# Patient Record
(demographics unavailable — no encounter records)

---

## 2024-10-10 NOTE — PHYSICAL EXAM
[EOMI] : grossly EOMI [Conjuctival Injection] : no conjunctival injection [Increased Tearing] : no increased tearing [Discharge] : discharge [Eyelid Swelling] : eyelid swelling [Right] : (right) [Allergic Shiners] : allergic shiners [NL] : warm, clear [FreeTextEntry5] : stye on right upper eyelid / a 'dot of discharge on upper eyelashes

## 2024-10-10 NOTE — HISTORY OF PRESENT ILLNESS
[FreeTextEntry6] : patient is here today because he was sent home from school because he had a 'dot' on his right upper eyelashes and that the dot seems to be getting bigger. there was no n/v/d/rash or fever / no medication has been given.

## 2024-10-30 NOTE — REVIEW OF SYSTEMS
[Difficulty with Sleep] : difficulty with sleep [Cough] : cough [Congestion] : congestion [Negative] : Genitourinary

## 2024-10-30 NOTE — DISCUSSION/SUMMARY
[FreeTextEntry1] : atypical pneumonia - azithromycin prescribes. continue albuterol 2 puffs every 4 to 6 hours and wean as tolerated.

## 2024-10-30 NOTE — HISTORY OF PRESENT ILLNESS
[FreeTextEntry6] : patient is here today because he has had dry cough for several days . he has had a cough on and off for a longer time   there has been no n/v/d/rash or fever.  he has had a difficult time sleeping. he had been given OTC cough medications which did not help. Grandmother had given albuterol via the spacer which helped temporarily but grandmother states that mom was reluctant to continue it.  he had been given the albuterol at a previous ER visit.

## 2024-10-30 NOTE — PHYSICAL EXAM
[Clear to Auscultation Bilaterally] : not clear to auscultation bilaterally [Wheezing] : no wheezing [Rales] : no rales [Crackles] : crackles [Transmitted Upper Airway Sounds] : no transmitted upper airway sounds [Tachypnea] : no tachypnea [Rhonchi] : no rhonchi [Belly Breathing] : no belly breathing [Subcostal Retractions] : no subcostal retractions [Suprasternal Retractions] : no suprasternal retractions [NL] : warm, clear [FreeTextEntry7] : left sided crackles